# Patient Record
Sex: MALE | NOT HISPANIC OR LATINO
[De-identification: names, ages, dates, MRNs, and addresses within clinical notes are randomized per-mention and may not be internally consistent; named-entity substitution may affect disease eponyms.]

---

## 2022-06-22 ENCOUNTER — RESULT REVIEW (OUTPATIENT)
Age: 57
End: 2022-06-22

## 2022-06-23 ENCOUNTER — TRANSCRIPTION ENCOUNTER (OUTPATIENT)
Age: 57
End: 2022-06-23

## 2022-07-11 ENCOUNTER — APPOINTMENT (OUTPATIENT)
Dept: SURGERY | Facility: CLINIC | Age: 57
End: 2022-07-11

## 2022-07-11 VITALS
SYSTOLIC BLOOD PRESSURE: 117 MMHG | BODY MASS INDEX: 22.63 KG/M2 | DIASTOLIC BLOOD PRESSURE: 81 MMHG | WEIGHT: 144.2 LBS | HEIGHT: 67 IN | HEART RATE: 77 BPM | TEMPERATURE: 98.5 F | OXYGEN SATURATION: 99 %

## 2022-07-11 DIAGNOSIS — Z87.898 PERSONAL HISTORY OF OTHER SPECIFIED CONDITIONS: ICD-10-CM

## 2022-07-11 DIAGNOSIS — R12 HEARTBURN: ICD-10-CM

## 2022-07-11 DIAGNOSIS — Z82.49 FAMILY HISTORY OF ISCHEMIC HEART DISEASE AND OTHER DISEASES OF THE CIRCULATORY SYSTEM: ICD-10-CM

## 2022-07-11 DIAGNOSIS — F17.200 NICOTINE DEPENDENCE, UNSPECIFIED, UNCOMPLICATED: ICD-10-CM

## 2022-07-11 DIAGNOSIS — Z87.891 PERSONAL HISTORY OF NICOTINE DEPENDENCE: ICD-10-CM

## 2022-07-11 DIAGNOSIS — R06.02 SHORTNESS OF BREATH: ICD-10-CM

## 2022-07-11 DIAGNOSIS — Z77.22 CONTACT WITH AND (SUSPECTED) EXPOSURE TO ENVIRONMENTAL TOBACCO SMOKE (ACUTE) (CHRONIC): ICD-10-CM

## 2022-07-11 DIAGNOSIS — K35.80 UNSPECIFIED ACUTE APPENDICITIS: ICD-10-CM

## 2022-07-11 DIAGNOSIS — Z72.89 OTHER PROBLEMS RELATED TO LIFESTYLE: ICD-10-CM

## 2022-07-11 PROCEDURE — 99024 POSTOP FOLLOW-UP VISIT: CPT

## 2022-07-11 RX ORDER — OMEPRAZOLE 20 MG/1
20 CAPSULE, DELAYED RELEASE ORAL
Refills: 0 | Status: ACTIVE | COMMUNITY

## 2022-07-11 NOTE — PLAN
[FreeTextEntry1] : Patient is 2 weeks status post appendectomy.  Pathology confirms suppurative appendicitis.  He is finished his antibiotics.  He feels well.  He is eating and going to the bathroom.  He has no fever.\par \par On exam his incision is well-healed with the exception of his infraumbilical site.  There is a small opening draining some serous fluid.  I suspect he is forming a suture granuloma.\par \par Plan: The wound is irrigated and lightly packed with iodoform gauze.  He does not wish to come back this week.  Therefore he will remove the packing himself on Wednesday in the shower and then apply dry dressing.  I told him he can expect this to take up to 6 weeks to heal because the absorbable suture will take that long to absorb.  He will follow back with me on an as-needed basis or if he has any concerns such as fever or spreading of redness.  He was offered an appointment for Wednesday and declines.  He states that he is too busy and cannot manage this on his own.

## 2022-08-01 ENCOUNTER — TRANSCRIPTION ENCOUNTER (OUTPATIENT)
Age: 57
End: 2022-08-01